# Patient Record
Sex: FEMALE | Race: WHITE
[De-identification: names, ages, dates, MRNs, and addresses within clinical notes are randomized per-mention and may not be internally consistent; named-entity substitution may affect disease eponyms.]

---

## 2018-03-05 ENCOUNTER — HOSPITAL ENCOUNTER (EMERGENCY)
Dept: HOSPITAL 74 - FER | Age: 23
Discharge: HOME | End: 2018-03-05
Payer: COMMERCIAL

## 2018-03-05 VITALS — HEART RATE: 88 BPM | DIASTOLIC BLOOD PRESSURE: 73 MMHG | TEMPERATURE: 98.4 F | SYSTOLIC BLOOD PRESSURE: 116 MMHG

## 2018-03-05 VITALS — BODY MASS INDEX: 20.5 KG/M2
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
## 2018-03-05 DIAGNOSIS — R21: Primary | ICD-10-CM

## 2018-03-05 DIAGNOSIS — T50.995A: ICD-10-CM

## 2018-03-05 NOTE — PDOC
History of Present Illness





- General


Chief Complaint: Rash


Stated Complaint: RASH TO FACE


Time Seen by Provider: 03/05/18 11:46





- History of Present Illness


Initial Comments: 





03/05/18 12:04


23yo female with no pmhx presents to the ED for eval of a rash to her face. Pt 

states she used cocoa butter last night to help with her acne scars. States she 

woke up this AM with acne all over her face. Pt c/o mild pruritis. Denies 

drainage. Mild redness. Pt denies lip or tongue swelling. No hives. Pt with an 

outbreak of acne on her face after using the cocoa butter. no cp/sob. No cough. 

No f/c. Speaking in full sentences.








03/05/18 12:06


PMHx: denies


PSHx: denies


Allergies: nkda


Meds: denies


social: denies tobacco, etoh, drugs





Past History





- Past Medical History


Allergies/Adverse Reactions: 


 Allergies











Allergy/AdvReac Type Severity Reaction Status Date / Time


 


No Known Allergies Allergy   Verified 03/05/18 11:44











Home Medications: 


Ambulatory Orders





Hydrocortisone 1% Cream [Hytone 1% Cream -] 1 applic TP DAILY #1 tube 03/05/18 








COPD: No


Other medical history: PT DENIES





- Suicide/Smoking/Psychosocial Hx


Smoking History: Never smoked


Have you smoked in the past 12 months: No


Hx Alcohol Use: No


Drug/Substance Use Hx: No





**Review of Systems





- Review of Systems


Able to Perform ROS?: Yes


Is the patient limited English proficient: No


Constitutional: No: Chills, Fever


HEENTM: No: Eye Pain, Nose Pain, Nose Congestion, Throat Pain


Respiratory: No: Cough, Shortness of Breath


Cardiac (ROS): No: Chest Pain


ABD/GI: No: Diarrhea, Nausea, Vomiting


Musculoskeletal: No: Back Pain, Neck Pain


Integumentary: Yes: Lesions, Pruritus, Rash


Neurological: No: Headache, Numbness


All Other Systems: Reviewed and Negative





*Physical Exam





- Vital Signs


 Last Vital Signs











Temp Pulse Resp BP Pulse Ox


 


 98.4 F   88   16   116/73   100 


 


 03/05/18 11:45  03/05/18 11:45  03/05/18 11:45  03/05/18 11:45  03/05/18 11:45














- Physical Exam


General Appearance: Yes: Nourished, Appropriately Dressed.  No: Apparent 

Distress


HEENT: positive: EOMI, Normal Voice, Pharynx Normal.  negative: Rhinorrhea


Neck: positive: Supple.  negative: Rigid


Respiratory/Chest: positive: Lungs Clear, Normal Breath Sounds


Cardiovascular: positive: Regular Rhythm, Regular Rate, S1, S2


Gastrointestinal/Abdominal: positive: Flat


Musculoskeletal: positive: Normal Inspection


Extremity: positive: Normal Inspection, Normal Range of Motion


Integumentary: positive: Dry, Warm, Rash, Other (acute acne breakout across 

face - lower face>forehead, localized to location she applied cocoa butter 

lotion).  negative: Erythema, Swelling


Neurologic: positive: CNs II-XII NML intact, Fully Oriented, Alert, Normal Mood/

Affect, Normal Response





Medical Decision Making





- Medical Decision Making





03/05/18 12:09


a/p: 23yo female with localized reaction to topical cocoa butter


-acute outbreak of acne after using cocoa butter


recommended disposing of product and not using any further products with 

fragrance, recommended hypoallogenic, fragrance free products


-recommended keeping the skin hydrated


-recommended hydrocortisone cream for localized reaction to cocoa butter and 

follow up with a dermatologist


will give dermatologist for follow up


answered all questions. discussed all reasons to return to the ED 


stable for d/c to home





*DC/Admit/Observation/Transfer


Diagnosis at time of Disposition: 


 Adverse reaction to drug that acts primarily on skin








- Discharge Dispostion


Disposition: HOME


Condition at time of disposition: Stable


Admit: No





- Prescriptions


Prescriptions: 


Hydrocortisone 1% Cream [Hytone 1% Cream -] 1 applic TP DAILY #1 tube





- Referrals


Referrals: 


Benita Brewer [Staff Physician] - 


Jayden Rodriguez MD [Staff Physician] - 





- Patient Instructions


Printed Discharge Instructions:  DI for Rash


Additional Instructions: 


Please stop using cocoa butter products on your skin. Please use hypoallergenic 

fragrance free products. Please keep the skin moisturized. Please do not pick 

at the lesions. Please use all meds as prescribed. Please return to the ED with 

any further complaints. Please make an appointment to see the dermatologist and 

your PMD in 2-3 days.





- Post Discharge Activity